# Patient Record
Sex: FEMALE | URBAN - METROPOLITAN AREA
[De-identification: names, ages, dates, MRNs, and addresses within clinical notes are randomized per-mention and may not be internally consistent; named-entity substitution may affect disease eponyms.]

---

## 2024-10-03 ENCOUNTER — OFFICE VISIT (OUTPATIENT)
Dept: URBAN - METROPOLITAN AREA CLINIC 105 | Facility: CLINIC | Age: 44
End: 2024-10-03
Payer: COMMERCIAL

## 2024-10-03 ENCOUNTER — DASHBOARD ENCOUNTERS (OUTPATIENT)
Age: 44
End: 2024-10-03

## 2024-10-03 VITALS
TEMPERATURE: 97.2 F | SYSTOLIC BLOOD PRESSURE: 121 MMHG | BODY MASS INDEX: 22.49 KG/M2 | HEIGHT: 65 IN | WEIGHT: 135 LBS | DIASTOLIC BLOOD PRESSURE: 80 MMHG | HEART RATE: 73 BPM

## 2024-10-03 DIAGNOSIS — Z80.0 FAMILY HISTORY OF COLON CANCER: ICD-10-CM

## 2024-10-03 DIAGNOSIS — Z12.11 SCREEN FOR COLON CANCER: ICD-10-CM

## 2024-10-03 DIAGNOSIS — E55.9 VITAMIN D DEFICIENCY: ICD-10-CM

## 2024-10-03 PROBLEM — 34713006: Status: ACTIVE | Noted: 2024-10-03

## 2024-10-03 PROCEDURE — 99204 OFFICE O/P NEW MOD 45 MIN: CPT | Performed by: INTERNAL MEDICINE

## 2024-10-03 RX ORDER — BENZONATATE 200 MG/1
TAKE ONE CAPSULE BY MOUTH THREE TIMES A DAY AS NEEDED FOR COUGH FOR 10 DAYS CAPSULE ORAL
Qty: 30 UNSPECIFIED | Refills: 0 | Status: ON HOLD | COMMUNITY

## 2024-10-03 RX ORDER — METHYLPREDNISOLONE 4 MG/1
TABLET ORAL
Qty: 21 TABLET | Status: ON HOLD | COMMUNITY

## 2024-10-03 RX ORDER — VALACYCLOVIR 500 MG/1
TAKE ONE TABLET BY MOUTH ONE TIME DAILY TABLET ORAL
Qty: 30 UNSPECIFIED | Refills: 2 | Status: ON HOLD | COMMUNITY

## 2024-10-03 RX ORDER — DOXYCYCLINE 100 MG/1
CAPSULE ORAL
Qty: 14 CAPSULE | Status: ON HOLD | COMMUNITY

## 2024-10-03 RX ORDER — FLUTICASONE PROPIONATE 50 UG/1
USE ONE SPRAY IN EACH NOSTRIL TWICE DAILY SPRAY, METERED NASAL
Qty: 16 UNSPECIFIED | Refills: 0 | Status: ON HOLD | COMMUNITY

## 2024-10-03 NOTE — HPI-TODAY'S VISIT:
The patient has a family history of colon, breast and liver cancer who presents for a screening colonoscopy. The pt denies difficulty or painful swallowing. The pt notes that she has inrregular BM''s and she can correlate it to the specific foods as well. She denies alcohol and tobacco abuse as well. She does sleep well and she will have decaffeinated green tea. She works out 4 times per week. Pt is a vegetarian and eats sevdral servings of of vegetables daily.  The pt's time of visit DOS is 45 minutes after reivew of the old records and op nontes.

## 2025-02-26 ENCOUNTER — OFFICE VISIT (OUTPATIENT)
Dept: URBAN - METROPOLITAN AREA SURGERY CENTER 16 | Facility: SURGERY CENTER | Age: 45
End: 2025-02-26

## 2025-06-18 ENCOUNTER — OFFICE VISIT (OUTPATIENT)
Dept: URBAN - METROPOLITAN AREA SURGERY CENTER 16 | Facility: SURGERY CENTER | Age: 45
End: 2025-06-18